# Patient Record
Sex: MALE | Race: WHITE | NOT HISPANIC OR LATINO
[De-identification: names, ages, dates, MRNs, and addresses within clinical notes are randomized per-mention and may not be internally consistent; named-entity substitution may affect disease eponyms.]

---

## 2017-12-27 PROBLEM — Z00.00 ENCOUNTER FOR PREVENTIVE HEALTH EXAMINATION: Status: ACTIVE | Noted: 2017-12-27

## 2018-01-04 ENCOUNTER — APPOINTMENT (OUTPATIENT)
Dept: CARDIOLOGY | Facility: CLINIC | Age: 73
End: 2018-01-04

## 2018-02-13 ENCOUNTER — OUTPATIENT (OUTPATIENT)
Dept: OUTPATIENT SERVICES | Facility: HOSPITAL | Age: 73
LOS: 1 days | End: 2018-02-13
Payer: MEDICARE

## 2018-02-13 PROCEDURE — 76770 US EXAM ABDO BACK WALL COMP: CPT | Mod: 26

## 2018-02-13 PROCEDURE — 76856 US EXAM PELVIC COMPLETE: CPT | Mod: 26

## 2018-02-14 ENCOUNTER — RECORD ABSTRACTING (OUTPATIENT)
Age: 73
End: 2018-02-14

## 2018-02-14 DIAGNOSIS — Z78.9 OTHER SPECIFIED HEALTH STATUS: ICD-10-CM

## 2018-02-14 DIAGNOSIS — Z86.39 PERSONAL HISTORY OF OTHER ENDOCRINE, NUTRITIONAL AND METABOLIC DISEASE: ICD-10-CM

## 2018-02-14 DIAGNOSIS — Z86.69 PERSONAL HISTORY OF OTHER DISEASES OF THE NERVOUS SYSTEM AND SENSE ORGANS: ICD-10-CM

## 2018-02-14 DIAGNOSIS — Z86.79 PERSONAL HISTORY OF OTHER DISEASES OF THE CIRCULATORY SYSTEM: ICD-10-CM

## 2018-02-15 ENCOUNTER — APPOINTMENT (OUTPATIENT)
Dept: CARDIOLOGY | Facility: CLINIC | Age: 73
End: 2018-02-15

## 2018-03-27 ENCOUNTER — NON-APPOINTMENT (OUTPATIENT)
Age: 73
End: 2018-03-27

## 2018-03-27 ENCOUNTER — APPOINTMENT (OUTPATIENT)
Dept: CARDIOLOGY | Facility: CLINIC | Age: 73
End: 2018-03-27
Payer: MEDICARE

## 2018-03-27 VITALS
DIASTOLIC BLOOD PRESSURE: 64 MMHG | BODY MASS INDEX: 30.53 KG/M2 | HEIGHT: 66 IN | SYSTOLIC BLOOD PRESSURE: 118 MMHG | WEIGHT: 190 LBS | HEART RATE: 77 BPM

## 2018-03-27 PROCEDURE — 99213 OFFICE O/P EST LOW 20 MIN: CPT

## 2019-03-27 ENCOUNTER — APPOINTMENT (OUTPATIENT)
Dept: CARDIOLOGY | Facility: CLINIC | Age: 74
End: 2019-03-27
Payer: MEDICARE

## 2019-03-27 ENCOUNTER — NON-APPOINTMENT (OUTPATIENT)
Age: 74
End: 2019-03-27

## 2019-03-27 VITALS
HEART RATE: 77 BPM | BODY MASS INDEX: 31.5 KG/M2 | WEIGHT: 196 LBS | SYSTOLIC BLOOD PRESSURE: 116 MMHG | OXYGEN SATURATION: 100 % | DIASTOLIC BLOOD PRESSURE: 56 MMHG | HEIGHT: 66 IN

## 2019-03-27 PROCEDURE — 93000 ELECTROCARDIOGRAM COMPLETE: CPT

## 2019-03-27 NOTE — PHYSICAL EXAM
[General Appearance - Well Developed] : well developed [Normal Appearance] : normal appearance [Well Groomed] : well groomed [General Appearance - Well Nourished] : well nourished [No Deformities] : no deformities [General Appearance - In No Acute Distress] : no acute distress [Normal Conjunctiva] : the conjunctiva exhibited no abnormalities [Eyelids - No Xanthelasma] : the eyelids demonstrated no xanthelasmas [Normal Oral Mucosa] : normal oral mucosa [No Oral Pallor] : no oral pallor [No Oral Cyanosis] : no oral cyanosis [Normal Jugular Venous A Waves Present] : normal jugular venous A waves present [Normal Jugular Venous V Waves Present] : normal jugular venous V waves present [No Jugular Venous Awad A Waves] : no jugular venous awad A waves [] : no respiratory distress [Respiration, Rhythm And Depth] : normal respiratory rhythm and effort [Exaggerated Use Of Accessory Muscles For Inspiration] : no accessory muscle use [Auscultation Breath Sounds / Voice Sounds] : lungs were clear to auscultation bilaterally [Heart Rate And Rhythm] : heart rate and rhythm were normal [Heart Sounds] : normal S1 and S2 [Murmurs] : no murmurs present

## 2019-03-27 NOTE — ASSESSMENT
[FreeTextEntry1] : Chest discomfort: The patient is status post bypass surgery. Approximately 2 months ago he developed chest discomfort syndrome. He was partially related to food intake. At times it was exertional. It lasted almost an entire month and then spontaneously resolved. Patient now is fairly good exercise ability without exertional symptoms. Exercise nuclear stress testing is the best option rule out ischemia. Transthoracic echocardiography is indicated to assess for pericardial disease.\par \par Cholesterol: The patient is tolerating statin therapy without difficulty.\par \par Mitral regurgitation: Will re re assess after echocardiography.

## 2019-05-06 ENCOUNTER — CLINICAL ADVICE (OUTPATIENT)
Age: 74
End: 2019-05-06

## 2019-05-06 ENCOUNTER — APPOINTMENT (OUTPATIENT)
Dept: CARDIOLOGY | Facility: CLINIC | Age: 74
End: 2019-05-06
Payer: MEDICARE

## 2019-05-06 PROCEDURE — 78452 HT MUSCLE IMAGE SPECT MULT: CPT

## 2019-05-06 PROCEDURE — A9502: CPT

## 2019-05-06 PROCEDURE — 93015 CV STRESS TEST SUPVJ I&R: CPT

## 2019-05-06 PROCEDURE — 93306 TTE W/DOPPLER COMPLETE: CPT

## 2019-05-10 ENCOUNTER — APPOINTMENT (OUTPATIENT)
Dept: CARDIOLOGY | Facility: CLINIC | Age: 74
End: 2019-05-10
Payer: MEDICARE

## 2019-05-10 ENCOUNTER — NON-APPOINTMENT (OUTPATIENT)
Age: 74
End: 2019-05-10

## 2019-05-10 VITALS
RESPIRATION RATE: 10 BRPM | HEART RATE: 72 BPM | BODY MASS INDEX: 30.53 KG/M2 | DIASTOLIC BLOOD PRESSURE: 62 MMHG | HEIGHT: 66 IN | SYSTOLIC BLOOD PRESSURE: 124 MMHG | WEIGHT: 190 LBS

## 2019-05-10 VITALS
WEIGHT: 190 LBS | HEIGHT: 66 IN | DIASTOLIC BLOOD PRESSURE: 62 MMHG | BODY MASS INDEX: 30.53 KG/M2 | HEART RATE: 72 BPM | SYSTOLIC BLOOD PRESSURE: 124 MMHG

## 2019-05-10 PROCEDURE — 93000 ELECTROCARDIOGRAM COMPLETE: CPT

## 2019-05-10 PROCEDURE — 99215 OFFICE O/P EST HI 40 MIN: CPT | Mod: 25

## 2019-05-10 NOTE — PHYSICAL EXAM
[Normal Appearance] : normal appearance [General Appearance - Well Developed] : well developed [General Appearance - In No Acute Distress] : no acute distress [General Appearance - Well Nourished] : well nourished [Well Groomed] : well groomed [Normal Conjunctiva] : the conjunctiva exhibited no abnormalities [Eyelids - No Xanthelasma] : the eyelids demonstrated no xanthelasmas [Normal Oral Mucosa] : normal oral mucosa [No Oral Pallor] : no oral pallor [No Oral Cyanosis] : no oral cyanosis [Respiration, Rhythm And Depth] : normal respiratory rhythm and effort [Exaggerated Use Of Accessory Muscles For Inspiration] : no accessory muscle use [Auscultation Breath Sounds / Voice Sounds] : lungs were clear to auscultation bilaterally [Heart Rate And Rhythm] : heart rate and rhythm were normal [Heart Sounds] : normal S1 and S2 [Murmurs] : no murmurs present [Abnormal Walk] : normal gait [Nail Clubbing] : no clubbing of the fingernails [Cyanosis, Localized] : no localized cyanosis [Petechial Hemorrhages (___cm)] : no petechial hemorrhages [] : no rash [Skin Turgor] : normal skin turgor [Memory Recent] : recent memory was not impaired [Affect] : the affect was normal [Impaired Insight] : insight and judgment were intact

## 2019-05-10 NOTE — DISCUSSION/SUMMARY
[FreeTextEntry1] : 1. Coronary Artery Disease: reviewed catheterization report from 2012 with patient. Although nuclear images reveal no ischemia patient with exertional angina. At this point, given patient's severe CAD and anatomy, I would recommend to maximize anti-anginal medications before invasive procedure considered. He was started on Metoprolol Succinate 25mg daily (high risk medication with no signs of toxicity). I will add Imdur 30mg daily. Patient can follow up with 6 weeks to determine of these medications can be titrated up. Asked patient to call [phone] immediately if her experiences persistent pain that is not relieved with rest.

## 2019-05-10 NOTE — HISTORY OF PRESENT ILLNESS
[FreeTextEntry1] : This is a 73 year old male with CAD s/p CABG. Underwent nuclear stress testing for exertional angina. Patient started on Toprol XL 25mg daily the day of stress testing. Patient continues with exertional angina.

## 2019-05-10 NOTE — REVIEW OF SYSTEMS
[Chest Pain] : chest pain [Joint Pain] : joint pain [Joint Stiffness] : joint stiffness [Negative] : Endocrine [Shortness Of Breath] : no shortness of breath [Lower Ext Edema] : no extremity edema [Dyspnea on exertion] : not dyspnea during exertion [Palpitations] : no palpitations [Easy Bleeding] : no tendency for easy bleeding [Easy Bruising] : no tendency for easy bruising

## 2019-06-28 ENCOUNTER — APPOINTMENT (OUTPATIENT)
Dept: CARDIOLOGY | Facility: CLINIC | Age: 74
End: 2019-06-28

## 2019-11-03 ENCOUNTER — TRANSCRIPTION ENCOUNTER (OUTPATIENT)
Age: 74
End: 2019-11-03

## 2020-07-03 ENCOUNTER — TRANSCRIPTION ENCOUNTER (OUTPATIENT)
Age: 75
End: 2020-07-03

## 2020-07-03 ENCOUNTER — EMERGENCY (EMERGENCY)
Facility: HOSPITAL | Age: 75
LOS: 1 days | End: 2020-07-03
Admitting: EMERGENCY MEDICINE
Payer: MEDICARE

## 2020-07-03 PROCEDURE — 99283 EMERGENCY DEPT VISIT LOW MDM: CPT | Mod: 25

## 2020-07-03 PROCEDURE — 65220 REMOVE FOREIGN BODY FROM EYE: CPT

## 2020-08-06 ENCOUNTER — NON-APPOINTMENT (OUTPATIENT)
Age: 75
End: 2020-08-06

## 2020-08-06 ENCOUNTER — APPOINTMENT (OUTPATIENT)
Dept: CARDIOLOGY | Facility: CLINIC | Age: 75
End: 2020-08-06
Payer: MEDICARE

## 2020-08-06 VITALS
HEIGHT: 66 IN | BODY MASS INDEX: 28.93 KG/M2 | SYSTOLIC BLOOD PRESSURE: 108 MMHG | HEART RATE: 62 BPM | OXYGEN SATURATION: 99 % | DIASTOLIC BLOOD PRESSURE: 60 MMHG | TEMPERATURE: 97.1 F | WEIGHT: 180 LBS

## 2020-08-06 PROCEDURE — 99214 OFFICE O/P EST MOD 30 MIN: CPT | Mod: 25

## 2020-08-06 PROCEDURE — 93000 ELECTROCARDIOGRAM COMPLETE: CPT

## 2020-08-06 RX ORDER — ISOSORBIDE MONONITRATE 30 MG/1
30 TABLET, EXTENDED RELEASE ORAL
Qty: 30 | Refills: 6 | Status: DISCONTINUED | COMMUNITY
Start: 2019-05-10 | End: 2020-08-06

## 2020-08-06 RX ORDER — SIMVASTATIN 40 MG/1
40 TABLET, FILM COATED ORAL DAILY
Refills: 0 | Status: DISCONTINUED | COMMUNITY
End: 2020-08-06

## 2020-08-06 RX ORDER — METOPROLOL SUCCINATE 25 MG/1
25 TABLET, EXTENDED RELEASE ORAL
Qty: 90 | Refills: 2 | Status: DISCONTINUED | COMMUNITY
Start: 2019-05-06 | End: 2020-08-06

## 2020-08-06 NOTE — REVIEW OF SYSTEMS
[Chest Pain] : chest pain [Joint Pain] : joint pain [Joint Stiffness] : joint stiffness [Negative] : Endocrine [Shortness Of Breath] : no shortness of breath [Dyspnea on exertion] : not dyspnea during exertion [Lower Ext Edema] : no extremity edema [Easy Bleeding] : no tendency for easy bleeding [Palpitations] : no palpitations [Easy Bruising] : no tendency for easy bruising

## 2020-08-06 NOTE — PHYSICAL EXAM
[General Appearance - Well Developed] : well developed [Well Groomed] : well groomed [Normal Appearance] : normal appearance [General Appearance - Well Nourished] : well nourished [General Appearance - In No Acute Distress] : no acute distress [Normal Conjunctiva] : the conjunctiva exhibited no abnormalities [Eyelids - No Xanthelasma] : the eyelids demonstrated no xanthelasmas [Normal Oral Mucosa] : normal oral mucosa [No Oral Pallor] : no oral pallor [No Oral Cyanosis] : no oral cyanosis [Respiration, Rhythm And Depth] : normal respiratory rhythm and effort [Exaggerated Use Of Accessory Muscles For Inspiration] : no accessory muscle use [Auscultation Breath Sounds / Voice Sounds] : lungs were clear to auscultation bilaterally [Heart Rate And Rhythm] : heart rate and rhythm were normal [Heart Sounds] : normal S1 and S2 [Murmurs] : no murmurs present [Abnormal Walk] : normal gait [Nail Clubbing] : no clubbing of the fingernails [Petechial Hemorrhages (___cm)] : no petechial hemorrhages [Cyanosis, Localized] : no localized cyanosis [Skin Turgor] : normal skin turgor [] : no rash [Impaired Insight] : insight and judgment were intact [Affect] : the affect was normal [Memory Recent] : recent memory was not impaired

## 2020-08-06 NOTE — DISCUSSION/SUMMARY
[FreeTextEntry1] : 1. Coronary Artery Disease: patient s/p CABG x 3 in the past. Patient with no exertional angina, SOB at this time. HRs are already in the low 60s off beta blocker therapy. Continue off. Patient also had his simvastatin switched to rosuvastatin 20mg daily. His LDL was 104 on 7/17/2020. Goal is <70. Recommend repeat lipid panel in 3-6 months. If not at goal recommend titrating up to 40mg daily. Continue Aspirin 81mg daily, Lisinopril 10mg daily.\par \par 2. HTN: appears controlled. Continue Lisinopril 10mg daily\par \par 3. HLD: Patient had his simvastatin switched to rosuvastatin 20mg daily. Unclear when this happened. His LDL was 104 on 7/17/2020. Goal is <70. Recommend repeat lipid panel in 3-6 months. If not at goal recommend titrating up to 40mg daily.\par \par 4. Mild Mitral Valve Regurgitation: periodic echo surveillance.

## 2020-08-06 NOTE — HISTORY OF PRESENT ILLNESS
[FreeTextEntry1] : This is a 74 year old male with CAD s/p CABG. Last year he had exertional angina. He was started on Toprol XL 25mg daily and Imdur 30mg daily, in addition to his other medications. Unclear when these medications dropped off. Patient has poor insight in his medications and medical history. He currently denies any exertional chest pain, SOB, or palpitations. He admits to compliance with all his other medications and reports no adverse effects.

## 2021-02-10 ENCOUNTER — NON-APPOINTMENT (OUTPATIENT)
Age: 76
End: 2021-02-10

## 2021-02-10 ENCOUNTER — APPOINTMENT (OUTPATIENT)
Dept: CARDIOLOGY | Facility: CLINIC | Age: 76
End: 2021-02-10
Payer: MEDICARE

## 2021-02-10 VITALS
HEART RATE: 64 BPM | WEIGHT: 187 LBS | BODY MASS INDEX: 30.05 KG/M2 | OXYGEN SATURATION: 99 % | SYSTOLIC BLOOD PRESSURE: 142 MMHG | HEIGHT: 66 IN | TEMPERATURE: 97.1 F | DIASTOLIC BLOOD PRESSURE: 70 MMHG

## 2021-02-10 PROCEDURE — 99214 OFFICE O/P EST MOD 30 MIN: CPT | Mod: 25

## 2021-02-10 PROCEDURE — 93000 ELECTROCARDIOGRAM COMPLETE: CPT

## 2021-02-10 NOTE — DISCUSSION/SUMMARY
[FreeTextEntry1] : 1. Coronary Artery Disease: patient s/p CABG x 3 in the past. Patient with no exertional angina, SOB at this time. HRs are already in the low 60s off beta blocker therapy. Continue off. Patient also had his simvastatin switched to rosuvastatin 20mg daily. His LDL was 104 on 7/17/2020. Goal is <70. Patient had lipid panel done about 1-2 weeks ago with PCP. Will attempt to obtain and review.  If not at goal recommend titrating up to 40mg daily. Continue Aspirin 81mg daily, Lisinopril 5mg daily.\par \par 2. HTN: appears controlled. Continue Lisinopril 5mg daily\par \par 3. HLD: Continue rosuvastatin 20mg daily. His LDL was 104 on 7/17/2020. Goal is <70. Patient had lipid panel done about 1-2 weeks ago with PCP. Will attempt to obtain and review.  If not at goal recommend titrating up to 40mg daily.\par \par 4. Mild Mitral Valve Regurgitation: periodic echo surveillance. \par \par Follow up in 6 months.

## 2021-02-10 NOTE — REVIEW OF SYSTEMS
[Chest Pain] : chest pain [Joint Pain] : joint pain [Joint Stiffness] : joint stiffness [Negative] : Endocrine [Shortness Of Breath] : no shortness of breath [Dyspnea on exertion] : not dyspnea during exertion [Lower Ext Edema] : no extremity edema [Palpitations] : no palpitations [Easy Bleeding] : no tendency for easy bleeding [Easy Bruising] : no tendency for easy bruising

## 2021-02-10 NOTE — HISTORY OF PRESENT ILLNESS
[FreeTextEntry1] : Historical Perspective"\par This is a 74 year old male with CAD s/p CABG. Last year he had exertional angina. He was started on Toprol XL 25mg daily and Imdur 30mg daily, in addition to his other medications. Unclear when these medications dropped off. Patient has poor insight in his medications and medical history. He currently denies any exertional chest pain, SOB, or palpitations. He admits to compliance with all his other medications and reports no adverse effects. \par \par Current Health Status:\par Patient with no chest pain, SOB, or palpitations. No hospitalizations since seeing me last. Remains compliant with his medications and reports no adverse effects.\par

## 2021-02-10 NOTE — PHYSICAL EXAM
[General Appearance - Well Developed] : well developed [Normal Appearance] : normal appearance [Well Groomed] : well groomed [General Appearance - Well Nourished] : well nourished [General Appearance - In No Acute Distress] : no acute distress [Normal Conjunctiva] : the conjunctiva exhibited no abnormalities [Eyelids - No Xanthelasma] : the eyelids demonstrated no xanthelasmas [Normal Oral Mucosa] : normal oral mucosa [No Oral Pallor] : no oral pallor [No Oral Cyanosis] : no oral cyanosis [Respiration, Rhythm And Depth] : normal respiratory rhythm and effort [Exaggerated Use Of Accessory Muscles For Inspiration] : no accessory muscle use [Auscultation Breath Sounds / Voice Sounds] : lungs were clear to auscultation bilaterally [Heart Rate And Rhythm] : heart rate and rhythm were normal [Heart Sounds] : normal S1 and S2 [Murmurs] : no murmurs present [Abnormal Walk] : normal gait [Nail Clubbing] : no clubbing of the fingernails [Cyanosis, Localized] : no localized cyanosis [Petechial Hemorrhages (___cm)] : no petechial hemorrhages [Skin Turgor] : normal skin turgor [] : no rash [Impaired Insight] : insight and judgment were intact [Affect] : the affect was normal [Memory Recent] : recent memory was not impaired

## 2021-03-01 ENCOUNTER — APPOINTMENT (OUTPATIENT)
Dept: CARDIOLOGY | Facility: CLINIC | Age: 76
End: 2021-03-01
Payer: MEDICARE

## 2021-03-01 PROCEDURE — 93306 TTE W/DOPPLER COMPLETE: CPT

## 2021-08-05 ENCOUNTER — APPOINTMENT (OUTPATIENT)
Dept: CARDIOLOGY | Facility: CLINIC | Age: 76
End: 2021-08-05

## 2021-08-18 ENCOUNTER — NON-APPOINTMENT (OUTPATIENT)
Age: 76
End: 2021-08-18

## 2021-08-18 ENCOUNTER — APPOINTMENT (OUTPATIENT)
Dept: CARDIOLOGY | Facility: CLINIC | Age: 76
End: 2021-08-18
Payer: MEDICARE

## 2021-08-18 VITALS
HEIGHT: 64 IN | SYSTOLIC BLOOD PRESSURE: 132 MMHG | HEART RATE: 67 BPM | OXYGEN SATURATION: 99 % | BODY MASS INDEX: 31.92 KG/M2 | DIASTOLIC BLOOD PRESSURE: 62 MMHG | WEIGHT: 187 LBS | TEMPERATURE: 96.9 F

## 2021-08-18 PROCEDURE — 99214 OFFICE O/P EST MOD 30 MIN: CPT | Mod: 25

## 2021-08-18 PROCEDURE — 93000 ELECTROCARDIOGRAM COMPLETE: CPT

## 2021-08-18 NOTE — CARDIOLOGY SUMMARY
[de-identified] : 08/18/2021, NSR with extensive anterolateral infarct, inferior infarct, negative T waves. [de-identified] : 05/06/2019, Exercise Nuclear Stress Test: Exercised for 4 minutes and 30 seconds utilizing Weston Protocol. Downsloping ST depressions developed inferolaterally with exercise. Nuclear images revealed medium sized defects inferiorly and inferolaterally that are fixed. Ischemia was not evident.  [de-identified] : 5/6/2019, Mild MR, Basal inferior wall hypokinetic, Trace MR. LVEF 55%. \par

## 2021-08-18 NOTE — DISCUSSION/SUMMARY
[FreeTextEntry1] : 1. Coronary Artery Disease: patient s/p CABG x 3 in the past. Patient with no exertional angina, SOB at this time. HRs are already in the low 60s off beta blocker therapy. Continue off. Patient also had his simvastatin switched to rosuvastatin 20mg daily. His LDL was 104 on 7/17/2020. Goal is <70. Patient had lipid panel done about 1-2 weeks ago with PCP. Will attempt to obtain and review. If not at goal recommend titrating up to 40mg daily. Continue Aspirin 81mg daily, Lisinopril 5mg daily.\par \par 2. HTN: appears controlled. Continue Lisinopril 5mg daily\par \par 3. HLD: Continue rosuvastatin 20mg daily. \par \par 4. Mild Mitral Valve Regurgitation: periodic echo surveillance. \par \par Follow up in 6 months.

## 2021-08-18 NOTE — PHYSICAL EXAM
[Normal] : moves all extremities, no focal deficits, normal speech [de-identified] : No JVD, no carotid artery bruits auscultated bilaterally

## 2021-08-18 NOTE — HISTORY OF PRESENT ILLNESS
[FreeTextEntry1] : Historical Perspective"\par This is a 75 year old male with CAD s/p CABG. Last year he had exertional angina. He was started on Toprol XL 25mg daily and Imdur 30mg daily, in addition to his other medications. Unclear when these medications dropped off. Patient has poor insight in his medications and medical history. He currently denies any exertional chest pain, SOB, or palpitations. He admits to compliance with all his other medications and reports no adverse effects. \par \par Current Health Status:\par Patient with no chest pain, SOB, or palpitations. No hospitalizations since seeing me last. Remains compliant with his medications and reports no adverse effects.

## 2022-05-03 ENCOUNTER — APPOINTMENT (OUTPATIENT)
Dept: CARDIOLOGY | Facility: CLINIC | Age: 77
End: 2022-05-03

## 2022-05-05 ENCOUNTER — APPOINTMENT (OUTPATIENT)
Dept: CARDIOLOGY | Facility: CLINIC | Age: 77
End: 2022-05-05
Payer: MEDICARE

## 2022-05-05 ENCOUNTER — NON-APPOINTMENT (OUTPATIENT)
Age: 77
End: 2022-05-05

## 2022-05-05 VITALS
HEART RATE: 68 BPM | HEIGHT: 66 IN | DIASTOLIC BLOOD PRESSURE: 60 MMHG | SYSTOLIC BLOOD PRESSURE: 114 MMHG | BODY MASS INDEX: 29.57 KG/M2 | WEIGHT: 184 LBS | OXYGEN SATURATION: 99 %

## 2022-05-05 PROCEDURE — 99214 OFFICE O/P EST MOD 30 MIN: CPT | Mod: 25

## 2022-05-05 PROCEDURE — 93000 ELECTROCARDIOGRAM COMPLETE: CPT

## 2022-05-05 RX ORDER — GLIMEPIRIDE 4 MG/1
TABLET ORAL
Refills: 0 | Status: DISCONTINUED | COMMUNITY
End: 2022-05-05

## 2022-05-05 NOTE — DISCUSSION/SUMMARY
[FreeTextEntry1] : 1. Coronary Artery Disease: patient s/p CABG x 3 in the past. Patient with no exertional angina, SOB at this time. HRs are already in the 60s off beta blocker therapy. Continue off. Continue rosuvastatin 20mg daily.  Continue Aspirin 81mg daily, Lisinopril 10mg daily. Recommend surveillance nuclear stress test as it has been 3 years since last one.\par \par 2. HTN: appears controlled. Continue Lisinopril 10mg daily\par \par 3. HLD: Continue rosuvastatin 20mg daily. \par \par 4. Mild Mitral Valve Regurgitation: periodic echo surveillance. \par \par Follow up in 6 months.

## 2022-05-05 NOTE — CARDIOLOGY SUMMARY
[de-identified] : 05/5/2022, NSR with extensive anterolateral infarct, inferior infarct, negative T waves. [de-identified] : 05/06/2019, Exercise Nuclear Stress Test: Exercised for 4 minutes and 30 seconds utilizing Weston Protocol. Downsloping ST depressions developed inferolaterally with exercise. Nuclear images revealed medium sized defects inferiorly and inferolaterally that are fixed. Ischemia was not evident.  [de-identified] : 5/6/2019, Mild MR, Basal inferior wall hypokinetic, Trace MR. LVEF 55%. \par

## 2022-05-05 NOTE — PHYSICAL EXAM
[Normal] : moves all extremities, no focal deficits, normal speech [de-identified] : No carotid artery bruits auscultated bilaterally

## 2022-05-31 ENCOUNTER — APPOINTMENT (OUTPATIENT)
Dept: CARDIOLOGY | Facility: CLINIC | Age: 77
End: 2022-05-31
Payer: MEDICARE

## 2022-05-31 DIAGNOSIS — R07.9 CHEST PAIN, UNSPECIFIED: ICD-10-CM

## 2022-05-31 PROCEDURE — 93306 TTE W/DOPPLER COMPLETE: CPT

## 2022-05-31 PROCEDURE — 78452 HT MUSCLE IMAGE SPECT MULT: CPT

## 2022-05-31 PROCEDURE — A9502: CPT

## 2022-05-31 PROCEDURE — 93015 CV STRESS TEST SUPVJ I&R: CPT

## 2022-05-31 PROCEDURE — 99215 OFFICE O/P EST HI 40 MIN: CPT

## 2022-06-01 PROBLEM — R07.9 CHEST PAIN: Status: ACTIVE | Noted: 2019-03-27

## 2022-06-01 NOTE — REASON FOR VISIT
[Symptom and Test Evaluation] : symptom and test evaluation [Other: ____] : [unfilled] [FreeTextEntry3] : Puja Cohen

## 2022-06-01 NOTE — HISTORY OF PRESENT ILLNESS
[FreeTextEntry1] : Historical Perspective"\par This is a 76 year old male with CAD s/p CABG. Last year he had exertional angina. He was started on Toprol XL 25mg daily and Imdur 30mg daily, in addition to his other medications. Unclear when these medications dropped off. Patient has poor insight in his medications and medical history. He currently denies any exertional chest pain, SOB, or palpitations. He admits to compliance with all his other medications and reports no adverse effects. \par \par Current Health Status:\par Seen today for urgent evaluation.  Underwent nuclear stress testing this morning.  Developed 8 out of 10 chest pain with minimal exertion that required conversion to pharmacologic vasodilator.  Significant EKG changes with exercise and kristine-infarct ischemia noted on preliminary reading of myocardial perfusion information.  Chest pain resolved on its own within 5 minutes of rest.  Patient reports history of exertional chest discomfort.  Denies prolonged anginal symptoms with rest.  Currently asymptomatic.  Repeat EKG with no acute changes.

## 2022-06-01 NOTE — PHYSICAL EXAM
[Normal] : moves all extremities, no focal deficits, normal speech [de-identified] : No carotid artery bruits auscultated bilaterally

## 2022-06-01 NOTE — CARDIOLOGY SUMMARY
[de-identified] : 05/5/2022, NSR with extensive anterolateral infarct, inferior infarct, negative T waves. [de-identified] : 05/06/2019, Exercise Nuclear Stress Test: Exercised for 4 minutes and 30 seconds utilizing Weston Protocol. Downsloping ST depressions developed inferolaterally with exercise. Nuclear images revealed medium sized defects inferiorly and inferolaterally that are fixed. Ischemia was not evident.  [de-identified] : 5/6/2019, Mild MR, Basal inferior wall hypokinetic, Trace MR. LVEF 55%. \par

## 2022-06-01 NOTE — DISCUSSION/SUMMARY
[FreeTextEntry1] : 1. Coronary Artery Disease: patient s/p CABG x 3 in the past.  Now complaining of anginal symptoms.  Currently asymptomatic.  Abnormal nuclear stress test.  Recommend cardiac catheterization for further evaluation.  Continue aspirin and statin.  HRs are already in the 60s off beta blocker therapy. Continue off. Continue Lisinopril 10mg daily.  Red flag symptoms that would warrant emergent evaluation discussed.  Pt verbalizes understanding. \par \par 2. HTN: appears controlled. Continue Lisinopril 10mg daily\par \par 3. HLD: Continue rosuvastatin 20mg daily. \par \par 4. Mild Mitral Valve Regurgitation: periodic echo surveillance. \par \par Follow up after catheterization.

## 2022-06-06 ENCOUNTER — NON-APPOINTMENT (OUTPATIENT)
Age: 77
End: 2022-06-06

## 2022-07-25 DIAGNOSIS — Z01.818 ENCOUNTER FOR OTHER PREPROCEDURAL EXAMINATION: ICD-10-CM

## 2022-07-29 ENCOUNTER — LABORATORY RESULT (OUTPATIENT)
Age: 77
End: 2022-07-29

## 2022-07-30 LAB — SARS-COV-2 N GENE NPH QL NAA+PROBE: NOT DETECTED

## 2022-08-01 ENCOUNTER — OUTPATIENT (OUTPATIENT)
Dept: INPATIENT UNIT | Facility: HOSPITAL | Age: 77
LOS: 1 days | End: 2022-08-01

## 2022-08-01 PROCEDURE — 99024 POSTOP FOLLOW-UP VISIT: CPT

## 2022-08-01 PROCEDURE — 93455 CORONARY ART/GRFT ANGIO S&I: CPT | Mod: 26,XU

## 2022-08-01 PROCEDURE — 93010 ELECTROCARDIOGRAM REPORT: CPT | Mod: 76

## 2022-08-01 PROCEDURE — 92937 PRQ TRLUML REVSC CAB GRF 1: CPT | Mod: LC

## 2022-08-02 PROCEDURE — 93010 ELECTROCARDIOGRAM REPORT: CPT

## 2022-08-03 ENCOUNTER — APPOINTMENT (OUTPATIENT)
Dept: CARDIOLOGY | Facility: CLINIC | Age: 77
End: 2022-08-03

## 2022-08-03 VITALS
OXYGEN SATURATION: 97 % | HEART RATE: 67 BPM | SYSTOLIC BLOOD PRESSURE: 114 MMHG | HEIGHT: 66 IN | BODY MASS INDEX: 28.93 KG/M2 | DIASTOLIC BLOOD PRESSURE: 70 MMHG | WEIGHT: 180 LBS | TEMPERATURE: 97.3 F

## 2022-08-03 PROCEDURE — 99214 OFFICE O/P EST MOD 30 MIN: CPT

## 2022-08-03 RX ORDER — ROSUVASTATIN CALCIUM 20 MG/1
20 TABLET, FILM COATED ORAL
Qty: 90 | Refills: 3 | Status: DISCONTINUED | COMMUNITY
Start: 2020-08-06 | End: 2022-08-03

## 2022-08-03 RX ORDER — LISINOPRIL 10 MG/1
10 TABLET ORAL
Qty: 90 | Refills: 3 | Status: DISCONTINUED | COMMUNITY
Start: 2021-12-01 | End: 2022-08-03

## 2022-08-03 NOTE — ASSESSMENT
[FreeTextEntry1] : CAD: Patient is status post coronary artery bypass grafting.  Recently underwent stenting of SVG to OM.  He got marked clinical benefit.  There has been no recurrence of chest discomfort or shortness of breath.  The patient feels excellent.\par \par The need for compliance with dual antiplatelet therapy has been reviewed.\par \par Hypertension: Well-controlled.\par \par Anemia: Repeat CBC has been arranged.\par \par Renal insufficiency: At [facility] his renal function was normal.\par \par Hyperlipidemia: Today we increased his rosuvastatin from 20 mg daily up to 40 mg daily.  LFTs and cholesterol profile in the fasting state have been arranged for 6 weeks time.\par \par Echo, labs, cath reviewed.

## 2022-08-10 DIAGNOSIS — E78.5 HYPERLIPIDEMIA, UNSPECIFIED: ICD-10-CM

## 2022-08-10 DIAGNOSIS — I10 ESSENTIAL (PRIMARY) HYPERTENSION: ICD-10-CM

## 2022-08-10 DIAGNOSIS — I25.10 ATHEROSCLEROTIC HEART DISEASE OF NATIVE CORONARY ARTERY WITHOUT ANGINA PECTORIS: ICD-10-CM

## 2022-08-10 DIAGNOSIS — I20.0 UNSTABLE ANGINA: ICD-10-CM

## 2022-08-10 DIAGNOSIS — Z95.1 PRESENCE OF AORTOCORONARY BYPASS GRAFT: ICD-10-CM

## 2022-08-10 DIAGNOSIS — R07.9 CHEST PAIN, UNSPECIFIED: ICD-10-CM

## 2022-08-10 DIAGNOSIS — I34.0 NONRHEUMATIC MITRAL (VALVE) INSUFFICIENCY: ICD-10-CM

## 2022-08-10 DIAGNOSIS — Z79.82 LONG TERM (CURRENT) USE OF ASPIRIN: ICD-10-CM

## 2022-08-16 ENCOUNTER — NON-APPOINTMENT (OUTPATIENT)
Age: 77
End: 2022-08-16

## 2022-08-18 ENCOUNTER — NON-APPOINTMENT (OUTPATIENT)
Age: 77
End: 2022-08-18

## 2022-08-18 ENCOUNTER — APPOINTMENT (OUTPATIENT)
Dept: CARDIOLOGY | Facility: CLINIC | Age: 77
End: 2022-08-18

## 2022-08-18 VITALS
SYSTOLIC BLOOD PRESSURE: 118 MMHG | HEIGHT: 66 IN | BODY MASS INDEX: 28.28 KG/M2 | WEIGHT: 176 LBS | OXYGEN SATURATION: 98 % | TEMPERATURE: 96.9 F | HEART RATE: 62 BPM | DIASTOLIC BLOOD PRESSURE: 62 MMHG

## 2022-08-18 DIAGNOSIS — R42 DIZZINESS AND GIDDINESS: ICD-10-CM

## 2022-08-18 PROCEDURE — 93242 EXT ECG>48HR<7D RECORDING: CPT | Mod: 59

## 2022-08-18 PROCEDURE — 99214 OFFICE O/P EST MOD 30 MIN: CPT

## 2022-08-18 NOTE — ADDENDUM
[FreeTextEntry1] : Please note the patient was reviewed with the PA.\par I was physically present during the service of the patient\par I was directly involved in the management plan and recommendations of care provided to the patient. \par I personally reviewed the history and physical exam and plan as documented by the PA above.\par \par Tarun Knox DO, FACC, RPVI\par Cardiologist\par \par 8/18/2022\par

## 2022-08-18 NOTE — PHYSICAL EXAM
[Well Developed] : well developed [Well Nourished] : well nourished [No Acute Distress] : no acute distress [Normal Conjunctiva] : normal conjunctiva [Normal Venous Pressure] : normal venous pressure [No Carotid Bruit] : no carotid bruit [Normal] : normal S1, S2, no murmur, no rub, no gallop [Normal S1, S2] : normal S1, S2 [No Murmur] : no murmur [No Rub] : no rub [No Gallop] : no gallop [Clear Lung Fields] : clear lung fields [Good Air Entry] : good air entry [No Respiratory Distress] : no respiratory distress  [Soft] : abdomen soft [Non Tender] : non-tender [No Masses/organomegaly] : no masses/organomegaly [Normal Bowel Sounds] : normal bowel sounds [Normal Gait] : normal gait [No Edema] : no edema [No Cyanosis] : no cyanosis [No Clubbing] : no clubbing [No Varicosities] : no varicosities [No Rash] : no rash [No Skin Lesions] : no skin lesions [Moves all extremities] : moves all extremities [No Focal Deficits] : no focal deficits [Normal Speech] : normal speech [Alert and Oriented] : alert and oriented [Normal memory] : normal memory

## 2022-08-18 NOTE — HISTORY OF PRESENT ILLNESS
[FreeTextEntry1] : MALIKA NVAARRO  is a 76 year M  who presents today Aug 18, 2022 complaining of lightheadedness with quick positional change of lying to standing first thing in the morning. Symptoms have improved today. There is no associated chest pain or shortness of breath. \par Overall he has been feeling well. There has been no recent illness or hospital stay. Medications have remained unchanged. \par \par Today he denies chest pain, pressure, unusual shortness of breath, lightheadedness, dizziness, near syncope or syncope. \par \par \par CAD: Patient is status post coronary artery bypass grafting.  Recently underwent stenting of SVG to OM.  He got marked clinical benefit.  There has been no recurrence of chest discomfort or shortness of breath.  The patient feels excellent.\par \par The need for compliance with dual antiplatelet therapy has been reviewed.\par \par Hypertension: Well-controlled.\par \par Anemia: Repeat CBC has been arranged.\par \par Renal insufficiency: At [facility] his renal function was normal.\par \par Hyperlipidemia: Today we increased his rosuvastatin from 20 mg daily up to 40 mg daily.  LFTs and cholesterol profile in the fasting state have been arranged for 6 weeks time.\par \par Echo, labs, cath reviewed.

## 2022-08-18 NOTE — ASSESSMENT
[FreeTextEntry1] : MALIKA NAVARRO  is a 76 year M  who presents today Aug 18, 2022 with the above history and the following active issues.\par \par CAD: Patient is status post coronary artery bypass grafting.  Recently underwent stenting of SVG to OM.  He got marked clinical benefit.  There has been no recurrence of chest discomfort or shortness of breath.  \par \par The need for compliance with dual antiplatelet therapy has been reviewed.\par \par Hypertension: Well-controlled.\par \par Anemia: Repeat CBC has been arranged.\par \par Renal insufficiency: At [facility] his renal function was normal.\par \par Hyperlipidemia: Today we increased his rosuvastatin from 20 mg daily up to 40 mg daily.  \par \par Lightheadedness. Patient blood pressure on my assessment 108/60 and not orthostatic. Recommend follow-up labs. 3 day ZIO monitor. Consider purchasing home blood pressure monitor.\par \par Red flag symptoms which would warrant sooner emergent evaluation reviewed with the patient. \par Questions and concerns were addressed and answered. \par Limitations of non-invasive testing reviewed.\par \par Sincerely,\par \par Esperanza Palacios PA-C\par Patients history, testing and plan reviewed with supervising MD: Dr. Knox\par \par

## 2022-08-30 ENCOUNTER — APPOINTMENT (OUTPATIENT)
Dept: CARDIOLOGY | Facility: CLINIC | Age: 77
End: 2022-08-30

## 2022-08-30 PROCEDURE — 93244 EXT ECG>48HR<7D REV&INTERPJ: CPT

## 2022-09-21 ENCOUNTER — NON-APPOINTMENT (OUTPATIENT)
Age: 77
End: 2022-09-21

## 2022-10-05 ENCOUNTER — APPOINTMENT (OUTPATIENT)
Dept: CARDIOLOGY | Facility: CLINIC | Age: 77
End: 2022-10-05

## 2022-10-05 VITALS
SYSTOLIC BLOOD PRESSURE: 110 MMHG | TEMPERATURE: 96.9 F | DIASTOLIC BLOOD PRESSURE: 58 MMHG | HEIGHT: 66 IN | WEIGHT: 180 LBS | OXYGEN SATURATION: 98 % | HEART RATE: 60 BPM | BODY MASS INDEX: 28.93 KG/M2

## 2022-10-05 PROCEDURE — 99214 OFFICE O/P EST MOD 30 MIN: CPT

## 2022-10-05 NOTE — HISTORY OF PRESENT ILLNESS
[FreeTextEntry1] : CAD: The patient underwent coronary stenting of the SVG to the OM.  This resulted in marked clinical benefit which she continues to enjoy to this day.  There is no chest discomfort.  There is no shortness of breath.\par \par Hypertension: Well-controlled.\par \par Anemia: The patient was advised to get repeat CBC.  He states he did not get it done but will get it done soon.\par \par Hyperlipidemia: LFTs and cholesterol profile on increased dose of rosuvastatin have been arranged.

## 2022-12-22 ENCOUNTER — APPOINTMENT (OUTPATIENT)
Dept: CARDIOLOGY | Facility: CLINIC | Age: 77
End: 2022-12-22

## 2022-12-22 VITALS
HEIGHT: 66 IN | BODY MASS INDEX: 28.93 KG/M2 | DIASTOLIC BLOOD PRESSURE: 66 MMHG | SYSTOLIC BLOOD PRESSURE: 116 MMHG | OXYGEN SATURATION: 99 % | HEART RATE: 56 BPM | WEIGHT: 180 LBS

## 2022-12-22 PROCEDURE — 99214 OFFICE O/P EST MOD 30 MIN: CPT

## 2022-12-22 NOTE — CARDIOLOGY SUMMARY
[de-identified] : 05/5/2022, NSR with extensive anterolateral infarct, inferior infarct, negative T waves. [de-identified] : 05/06/2019, Exercise Nuclear Stress Test: Exercised for 4 minutes and 30 seconds utilizing Weston Protocol. Downsloping ST depressions developed inferolaterally with exercise. Nuclear images revealed medium sized defects inferiorly and inferolaterally that are fixed. Ischemia was not evident.  [de-identified] : 5/6/2019, Mild MR, Basal inferior wall hypokinetic, Trace MR. LVEF 55%. \par  [de-identified] : 8/1/2022, 3 stents to SVG-->OM

## 2022-12-22 NOTE — PHYSICAL EXAM
[Normal] : moves all extremities, no focal deficits, normal speech [de-identified] : No carotid artery bruits auscultated bilaterally

## 2022-12-22 NOTE — HISTORY OF PRESENT ILLNESS
[FreeTextEntry1] : Historical Perspective"\par This is a 75 year old male with CAD s/p CABG. Last year he had exertional angina. He was started on Toprol XL 25mg daily and Imdur 30mg daily, in addition to his other medications. Unclear when these medications dropped off. Patient has poor insight in his medications and medical history. He currently denies any exertional chest pain, SOB, or palpitations. He admits to compliance with all his other medications and reports no adverse effects. \par \par Current Health Status:\par Patient with no chest pain, SOB, or palpitations. Remains compliant with his medications and reports no adverse effects. Since seeing me last patient underwent cardiac catheterization with Dr. Alejo. He placed 3 stents in the SVG-->OM.

## 2022-12-22 NOTE — DISCUSSION/SUMMARY
[FreeTextEntry1] : 1. Coronary Artery Disease: patient s/p CABG x 3 in the past. Also s/p PCI with three stents to the SVG-->OM, 8/1/2022.  Patient with no exertional angina, SOB at this time. HRs are already in the 60s off beta blocker therapy. Continue off. Continue rosuvastatin 40mg daily. Goal LDL less than 70.   Continue Aspirin 81mg daily, Brilinta 90mg BID,  Lisinopril 10mg daily. \par \par 2. HTN: appears controlled. Continue Lisinopril 10mg daily\par \par 3. HLD: Continue rosuvastatin 40mg daily. Goal LDL less than 70.\par \par 4. Mild Mitral Valve Regurgitation: periodic echo surveillance. \par \par Follow up in 6 months.

## 2023-06-22 ENCOUNTER — APPOINTMENT (OUTPATIENT)
Dept: CARDIOLOGY | Facility: CLINIC | Age: 78
End: 2023-06-22

## 2023-06-27 ENCOUNTER — OFFICE (OUTPATIENT)
Dept: URBAN - METROPOLITAN AREA CLINIC 38 | Facility: CLINIC | Age: 78
Setting detail: OPHTHALMOLOGY
End: 2023-06-27
Payer: MEDICARE

## 2023-06-27 DIAGNOSIS — Z96.1: ICD-10-CM

## 2023-06-27 DIAGNOSIS — E11.3313: ICD-10-CM

## 2023-06-27 DIAGNOSIS — H02.831: ICD-10-CM

## 2023-06-27 DIAGNOSIS — H02.834: ICD-10-CM

## 2023-06-27 PROCEDURE — 92014 COMPRE OPH EXAM EST PT 1/>: CPT | Performed by: OPHTHALMOLOGY

## 2023-06-27 PROCEDURE — 92134 CPTRZ OPH DX IMG PST SGM RTA: CPT | Performed by: OPHTHALMOLOGY

## 2023-06-27 ASSESSMENT — CONFRONTATIONAL VISUAL FIELD TEST (CVF)
OD_FINDINGS: FULL
OS_FINDINGS: FULL

## 2023-06-27 ASSESSMENT — REFRACTION_AUTOREFRACTION
OD_CYLINDER: -0.25
OS_SPHERE: +0.25
OD_SPHERE: +0.25
OD_AXIS: 031
OS_CYLINDER: -0.50
OS_AXIS: 116

## 2023-06-27 ASSESSMENT — KERATOMETRY
OD_K1POWER_DIOPTERS: 43.75
OS_AXISANGLE_DEGREES: 041
OD_AXISANGLE_DEGREES: 096
OS_K1POWER_DIOPTERS: 43.75
METHOD_AUTO_MANUAL: AUTO
OS_K2POWER_DIOPTERS: 44.00
OD_K2POWER_DIOPTERS: 44.00

## 2023-06-27 ASSESSMENT — AXIALLENGTH_DERIVED
OD_AL: 23.4069
OS_AL: 23.455
OS_AL: 23.455

## 2023-06-27 ASSESSMENT — SUPERFICIAL PUNCTATE KERATITIS (SPK)
OD_SPK: T
OS_SPK: T

## 2023-06-27 ASSESSMENT — SPHEQUIV_DERIVED
OS_SPHEQUIV: 0
OS_SPHEQUIV: 0
OD_SPHEQUIV: 0.125

## 2023-06-27 ASSESSMENT — REFRACTION_MANIFEST
OS_SPHERE: +0.25
OS_VA1: 20/20-2
OD_VA1: 20/20-2
OD_AXIS: 030
OS_AXIS: 115
OS_CYLINDER: -0.50
OD_SPHERE: PLANO
OD_VA2: 20/20(J1+)
OS_VA2: 20/20(J1+)
OD_ADD: +2.50
OD_CYLINDER: -0.25
OU_VA: 20/20
OS_ADD: +2.50

## 2023-06-27 ASSESSMENT — VISUAL ACUITY
OD_BCVA: 20/20
OS_BCVA: 20/20

## 2023-06-27 ASSESSMENT — TONOMETRY
OD_IOP_MMHG: 11
OS_IOP_MMHG: 11

## 2023-06-27 ASSESSMENT — LID POSITION - DERMATOCHALASIS
OD_DERMATOCHALASIS: RUL 2+ 3+
OS_DERMATOCHALASIS: LUL 2+ 3+

## 2023-06-27 ASSESSMENT — REFRACTION_CURRENTRX
OD_ADD: +2.75
OS_ADD: +2.75
OD_OVR_VA: 20/
OS_OVR_VA: 20/

## 2023-07-20 ENCOUNTER — NON-APPOINTMENT (OUTPATIENT)
Age: 78
End: 2023-07-20

## 2023-07-20 ENCOUNTER — APPOINTMENT (OUTPATIENT)
Dept: CARDIOLOGY | Facility: CLINIC | Age: 78
End: 2023-07-20
Payer: MEDICARE

## 2023-07-20 VITALS
OXYGEN SATURATION: 99 % | HEART RATE: 64 BPM | WEIGHT: 175 LBS | BODY MASS INDEX: 28.12 KG/M2 | DIASTOLIC BLOOD PRESSURE: 70 MMHG | SYSTOLIC BLOOD PRESSURE: 126 MMHG | HEIGHT: 66 IN

## 2023-07-20 PROCEDURE — 93000 ELECTROCARDIOGRAM COMPLETE: CPT

## 2023-07-20 PROCEDURE — 99214 OFFICE O/P EST MOD 30 MIN: CPT

## 2023-07-20 RX ORDER — ASPIRIN 81 MG
81 TABLET, DELAYED RELEASE (ENTERIC COATED) ORAL DAILY
Refills: 0 | Status: DISCONTINUED | COMMUNITY
End: 2023-07-20

## 2023-07-20 RX ORDER — CHROMIUM 200 MCG
TABLET ORAL
Refills: 0 | Status: DISCONTINUED | COMMUNITY
End: 2023-07-20

## 2023-07-20 RX ORDER — ROSUVASTATIN CALCIUM 40 MG/1
40 TABLET, FILM COATED ORAL DAILY
Qty: 90 | Refills: 3 | Status: DISCONTINUED | COMMUNITY
End: 2023-07-20

## 2023-07-20 RX ORDER — GLIPIZIDE 10 MG/1
10 TABLET ORAL DAILY
Refills: 0 | Status: ACTIVE | COMMUNITY
Start: 2022-03-08

## 2023-07-20 NOTE — HISTORY OF PRESENT ILLNESS
[FreeTextEntry1] : Historical Perspective"\par This is a 77 year old male with CAD s/p CABG. Last year he had exertional angina. He was started on Toprol XL 25mg daily and Imdur 30mg daily, in addition to his other medications. Unclear when these medications dropped off. Patient has poor insight in his medications and medical history. He currently denies any exertional chest pain, SOB, or palpitations. He admits to compliance with all his other medications and reports no adverse effects. \par \par Current Health Status:\par Patient with no chest pain, SOB, or palpitations. Remains compliant with his medications and reports no adverse effects. Patient underwent cardiac catheterization with Dr. Alejo. He placed 3 stents in the SVG-->OM.

## 2023-07-20 NOTE — PHYSICAL EXAM
[Normal] : moves all extremities, no focal deficits, normal speech [de-identified] : No carotid artery bruits auscultated bilaterally

## 2023-07-20 NOTE — CARDIOLOGY SUMMARY
[de-identified] : 7/20/2023, NSR with Q waves inferiorly and non-specific T wave changes\par 05/5/2022, NSR with extensive anterolateral infarct, inferior infarct, negative T waves. [de-identified] : 05/06/2019, Exercise Nuclear Stress Test: Exercised for 4 minutes and 30 seconds utilizing Weston Protocol. Downsloping ST depressions developed inferolaterally with exercise. Nuclear images revealed medium sized defects inferiorly and inferolaterally that are fixed. Ischemia was not evident.  [de-identified] : 5/6/2019, Mild MR, Basal inferior wall hypokinetic, Trace MR. LVEF 55%. \par  [de-identified] : 8/1/2022, 3 stents to SVG-->OM

## 2023-07-20 NOTE — DISCUSSION/SUMMARY
[EKG obtained to assist in diagnosis and management of assessed problem(s)] : EKG obtained to assist in diagnosis and management of assessed problem(s) [FreeTextEntry1] : 1. Coronary Artery Disease: patient s/p CABG x 3 in the past. Also s/p PCI with three stents to the SVG-->OM, 8/1/2022.  Patient with no exertional angina, SOB at this time. HRs are already in the 60s off beta blocker therapy. Continue off. Continue rosuvastatin 40mg daily. Goal LDL less than 70.   Continue Aspirin 81mg daily and Brilinta 90mg BID.\par \par 2. HTN: off lisinopril given rise in creatinine to 1.59 on last labs. BP appears well controlled. Continue to monitor. \par \par 3. HLD: Continue rosuvastatin 40mg daily. Goal LDL less than 70.\par \par 4. Mild Mitral Valve Regurgitation: periodic echo surveillance. \par \par Follow up in 6 months.

## 2023-08-21 ENCOUNTER — APPOINTMENT (OUTPATIENT)
Dept: CARDIOLOGY | Facility: CLINIC | Age: 78
End: 2023-08-21

## 2023-12-27 ENCOUNTER — APPOINTMENT (OUTPATIENT)
Dept: CARDIOLOGY | Facility: CLINIC | Age: 78
End: 2023-12-27
Payer: MEDICARE

## 2023-12-27 PROCEDURE — 93306 TTE W/DOPPLER COMPLETE: CPT

## 2024-01-18 ENCOUNTER — APPOINTMENT (OUTPATIENT)
Dept: CARDIOLOGY | Facility: CLINIC | Age: 79
End: 2024-01-18
Payer: MEDICARE

## 2024-01-18 VITALS
BODY MASS INDEX: 30.53 KG/M2 | OXYGEN SATURATION: 100 % | WEIGHT: 190 LBS | SYSTOLIC BLOOD PRESSURE: 140 MMHG | DIASTOLIC BLOOD PRESSURE: 66 MMHG | HEART RATE: 71 BPM | HEIGHT: 66 IN

## 2024-01-18 DIAGNOSIS — E78.00 PURE HYPERCHOLESTEROLEMIA, UNSPECIFIED: ICD-10-CM

## 2024-01-18 DIAGNOSIS — I10 ESSENTIAL (PRIMARY) HYPERTENSION: ICD-10-CM

## 2024-01-18 DIAGNOSIS — R94.31 ABNORMAL ELECTROCARDIOGRAM [ECG] [EKG]: ICD-10-CM

## 2024-01-18 DIAGNOSIS — I25.10 ATHEROSCLEROTIC HEART DISEASE OF NATIVE CORONARY ARTERY W/OUT ANGINA PECTORIS: ICD-10-CM

## 2024-01-18 DIAGNOSIS — I34.0 NONRHEUMATIC MITRAL (VALVE) INSUFFICIENCY: ICD-10-CM

## 2024-01-18 PROCEDURE — 93000 ELECTROCARDIOGRAM COMPLETE: CPT

## 2024-01-18 PROCEDURE — 99214 OFFICE O/P EST MOD 30 MIN: CPT

## 2024-01-18 PROCEDURE — G2211 COMPLEX E/M VISIT ADD ON: CPT

## 2024-01-18 RX ORDER — TICAGRELOR 90 MG/1
90 TABLET ORAL TWICE DAILY
Qty: 90 | Refills: 2 | Status: DISCONTINUED | COMMUNITY
Start: 2022-08-01 | End: 2024-01-18

## 2024-01-18 RX ORDER — ROSUVASTATIN CALCIUM 40 MG/1
40 TABLET, FILM COATED ORAL
Qty: 90 | Refills: 3 | Status: ACTIVE | COMMUNITY
Start: 2024-01-18 | End: 1900-01-01

## 2024-01-18 RX ORDER — ROSUVASTATIN CALCIUM 40 MG/1
40 TABLET, FILM COATED ORAL
Qty: 90 | Refills: 3 | Status: DISCONTINUED | COMMUNITY
Start: 2023-07-20 | End: 2024-01-18

## 2024-01-18 RX ORDER — KRILL/OM-3/DHA/EPA/PHOSPHO/AST 1000-230MG
81 CAPSULE ORAL
Qty: 90 | Refills: 3 | Status: ACTIVE | COMMUNITY
Start: 2024-01-18 | End: 1900-01-01

## 2024-01-18 NOTE — PHYSICAL EXAM
[Normal] : moves all extremities, no focal deficits, normal speech [de-identified] : No carotid artery bruits auscultated bilaterally

## 2024-01-18 NOTE — HISTORY OF PRESENT ILLNESS
[FreeTextEntry1] : Historical Perspective" This is a 78 year old male with CAD s/p CABG. Last year he had exertional angina. He was started on Toprol XL 25mg daily and Imdur 30mg daily, in addition to his other medications. Unclear when these medications dropped off. Patient has poor insight in his medications and medical history. He currently denies any exertional chest pain, SOB, or palpitations. He admits to compliance with all his other medications and reports no adverse effects.   Current Health Status: Patient with no chest pain, SOB, or palpitations. Patient states "some doctor" stopped his Aspirin, Brillinta and rosuvastatin for an unclear reason. Wasn't having any issues with medications.

## 2024-01-18 NOTE — CARDIOLOGY SUMMARY
[de-identified] : 1/18/2024, NSR with Q waves inferiorly and non-specific T wave changes 7/20/2023, NSR with Q waves inferiorly and non-specific T wave changes 05/5/2022, NSR with extensive anterolateral infarct, inferior infarct, negative T waves. [de-identified] : 05/06/2019, Exercise Nuclear Stress Test: Exercised for 4 minutes and 30 seconds utilizing Weston Protocol. Downsloping ST depressions developed inferolaterally with exercise. Nuclear images revealed medium sized defects inferiorly and inferolaterally that are fixed. Ischemia was not evident.  [de-identified] : 12/27/2023, LV EF 55%, basal inferoseptal and basal inferior walls hypokinetic, mild MR, trace TR 5/6/2019, Mild MR, Basal inferior wall hypokinetic, Trace MR. LVEF 55%.   [de-identified] : 8/1/2022, 3 stents to SVG-->OM

## 2024-01-18 NOTE — DISCUSSION/SUMMARY
[FreeTextEntry1] : 1. Coronary Artery Disease: patient s/p CABG x 3 in the past. Also s/p PCI with three stents to the SVG-->OM, 8/1/2022.  Patient with no exertional angina, SOB at this time. HRs are already in the 60s off beta blocker therapy. Continue off. Continue rosuvastatin 40mg daily. Goal LDL less than 70.   Continue Aspirin 81mg daily.  2. HTN: off lisinopril given rise in creatinine to 1.59 on last labs. BP appears well controlled. Continue to monitor.   3. HLD: Continue rosuvastatin 40mg daily. Goal LDL less than 70.  4. Mild Mitral Valve Regurgitation: periodic echo surveillance.   Follow up in 6 months.  [EKG obtained to assist in diagnosis and management of assessed problem(s)] : EKG obtained to assist in diagnosis and management of assessed problem(s)

## 2024-03-13 PROBLEM — H25.13 CATARACT SENILE NUCLEAR SCLEROSIS; BOTH EYES: Status: ACTIVE | Noted: 2024-03-13

## 2024-05-16 PROBLEM — H40.033: Status: ACTIVE | Noted: 2024-05-16

## 2024-06-05 PROBLEM — H25.13 CATARACT SENILE NUCLEAR SCLEROSIS; BOTH EYES: Status: ACTIVE | Noted: 2024-06-05

## 2024-07-25 ENCOUNTER — NON-APPOINTMENT (OUTPATIENT)
Age: 79
End: 2024-07-25

## 2024-07-25 ENCOUNTER — APPOINTMENT (OUTPATIENT)
Dept: CARDIOLOGY | Facility: CLINIC | Age: 79
End: 2024-07-25
Payer: MEDICARE

## 2024-07-25 VITALS
HEART RATE: 64 BPM | WEIGHT: 177 LBS | HEIGHT: 66 IN | SYSTOLIC BLOOD PRESSURE: 138 MMHG | OXYGEN SATURATION: 97 % | BODY MASS INDEX: 28.45 KG/M2 | DIASTOLIC BLOOD PRESSURE: 72 MMHG

## 2024-07-25 DIAGNOSIS — R94.31 ABNORMAL ELECTROCARDIOGRAM [ECG] [EKG]: ICD-10-CM

## 2024-07-25 DIAGNOSIS — I10 ESSENTIAL (PRIMARY) HYPERTENSION: ICD-10-CM

## 2024-07-25 DIAGNOSIS — I25.10 ATHEROSCLEROTIC HEART DISEASE OF NATIVE CORONARY ARTERY W/OUT ANGINA PECTORIS: ICD-10-CM

## 2024-07-25 DIAGNOSIS — E78.00 PURE HYPERCHOLESTEROLEMIA, UNSPECIFIED: ICD-10-CM

## 2024-07-25 DIAGNOSIS — I34.0 NONRHEUMATIC MITRAL (VALVE) INSUFFICIENCY: ICD-10-CM

## 2024-07-25 PROCEDURE — 99214 OFFICE O/P EST MOD 30 MIN: CPT

## 2024-07-25 PROCEDURE — G2211 COMPLEX E/M VISIT ADD ON: CPT

## 2024-07-25 PROCEDURE — 93000 ELECTROCARDIOGRAM COMPLETE: CPT

## 2024-07-25 NOTE — HISTORY OF PRESENT ILLNESS
[FreeTextEntry1] : Historical Perspective" This is a 78 year old male with CAD s/p CABG. Last year he had exertional angina. He was started on Toprol XL 25mg daily and Imdur 30mg daily, in addition to his other medications. Unclear when these medications dropped off. Patient has poor insight in his medications and medical history. He currently denies any exertional chest pain, SOB, or palpitations. He admits to compliance with all his other medications and reports no adverse effects.   Current Health Status: Patient with no chest pain, SOB, or palpitations. No hospitalizations since seeing me last. Remains compliant with medications and reports no adverse effects.

## 2024-07-25 NOTE — PHYSICAL EXAM
[Normal] : moves all extremities, no focal deficits, normal speech [de-identified] : No carotid artery bruits auscultated bilaterally

## 2024-07-25 NOTE — CARDIOLOGY SUMMARY
[de-identified] : 7/25/2024, NSR with Q waves inferiorly and non-specific ST-T wave changes 1/18/2024, NSR with Q waves inferiorly and non-specific T wave changes 7/20/2023, NSR with Q waves inferiorly and non-specific T wave changes 05/5/2022, NSR with extensive anterolateral infarct, inferior infarct, negative T waves. [de-identified] : 05/06/2019, Exercise Nuclear Stress Test: Exercised for 4 minutes and 30 seconds utilizing Weston Protocol. Downsloping ST depressions developed inferolaterally with exercise. Nuclear images revealed medium sized defects inferiorly and inferolaterally that are fixed. Ischemia was not evident.  [de-identified] : 12/27/2023, LV EF 55%, basal inferoseptal and basal inferior walls hypokinetic, mild MR, trace TR 5/6/2019, Mild MR, Basal inferior wall hypokinetic, Trace MR. LVEF 55%.   [de-identified] : 8/1/2022, 3 stents to SVG-->OM

## 2025-01-24 ENCOUNTER — NON-APPOINTMENT (OUTPATIENT)
Age: 80
End: 2025-01-24

## 2025-01-24 ENCOUNTER — APPOINTMENT (OUTPATIENT)
Dept: CARDIOLOGY | Facility: CLINIC | Age: 80
End: 2025-01-24
Payer: MEDICARE

## 2025-01-24 VITALS
DIASTOLIC BLOOD PRESSURE: 60 MMHG | BODY MASS INDEX: 25.71 KG/M2 | SYSTOLIC BLOOD PRESSURE: 110 MMHG | HEIGHT: 66 IN | HEART RATE: 67 BPM | OXYGEN SATURATION: 96 % | WEIGHT: 160 LBS

## 2025-01-24 DIAGNOSIS — R94.31 ABNORMAL ELECTROCARDIOGRAM [ECG] [EKG]: ICD-10-CM

## 2025-01-24 DIAGNOSIS — I10 ESSENTIAL (PRIMARY) HYPERTENSION: ICD-10-CM

## 2025-01-24 DIAGNOSIS — E78.00 PURE HYPERCHOLESTEROLEMIA, UNSPECIFIED: ICD-10-CM

## 2025-01-24 DIAGNOSIS — I25.10 ATHEROSCLEROTIC HEART DISEASE OF NATIVE CORONARY ARTERY W/OUT ANGINA PECTORIS: ICD-10-CM

## 2025-01-24 DIAGNOSIS — I34.0 NONRHEUMATIC MITRAL (VALVE) INSUFFICIENCY: ICD-10-CM

## 2025-01-24 PROCEDURE — 99214 OFFICE O/P EST MOD 30 MIN: CPT

## 2025-01-24 PROCEDURE — G2211 COMPLEX E/M VISIT ADD ON: CPT

## 2025-01-24 PROCEDURE — 93000 ELECTROCARDIOGRAM COMPLETE: CPT

## 2025-01-24 RX ORDER — EMPAGLIFLOZIN 10 MG/1
10 TABLET, FILM COATED ORAL DAILY
Refills: 0 | Status: ACTIVE | COMMUNITY

## 2025-02-06 ENCOUNTER — APPOINTMENT (OUTPATIENT)
Dept: CARDIOLOGY | Facility: CLINIC | Age: 80
End: 2025-02-06
Payer: MEDICARE

## 2025-02-06 PROCEDURE — 93306 TTE W/DOPPLER COMPLETE: CPT

## 2025-02-13 NOTE — HISTORY OF PRESENT ILLNESS
"Care Management Follow Up    Length of Stay (days): 8    Expected Discharge Date: 02/13/2025     Concerns to be Addressed: discharge planning     Patient plan of care discussed at interdisciplinary rounds: Yes    Anticipated Discharge Disposition: Dialysis Services, Home Care  Anticipated Discharge Services: None  Anticipated Discharge DME: None    Patient/family educated on Medicare website which has current facility and service quality ratings:  yes  Education Provided on the Discharge Plan:  yes  Patient/Family in Agreement with the Plan: yes    Referrals Placed by CM/SW:  Home Care    Discussed  Partnership in Safe Discharge Planning  document with patient/family: No     Handoff Completed: No, handoff not indicated or clinically appropriate    Additional Information:  Patient was provided w transport resources per her request. She stated she has a friend who can provide transport to/from dialysis but not \"forever.\" CM also encouraged her to follow up w [facility]. Patient is aware she has been set up for OPHD at [facility].     OT recs for HH OT, patient was agreeable and okay w adding RN as well, CM sent referral. She has no agency preference.    Next Steps: follow up on HC referral, update [facility] when patient discharges    Care Management Discharge Note    Discharge Date: 02/13/2025       Discharge Disposition: Dialysis Services w/ [facility]  Discharge Services: Home Care referral sent  Discharge DME: None  Discharge Transportation: car, drives self, family or friend will provide    Patient/family educated on Medicare website which has current facility and service quality ratings:  yes    Education Provided on the Discharge Plan:  yes  Persons Notified of Discharge Plans: patient, hospitalist, nephrologist  Patient/Family in Agreement with the Plan: yes    Handoff Referral Completed: Yes, MARISSA PCP: Internal handoff referral completed    Additional Information:  RN CC following in " [FreeTextEntry1] : CAD: Patient is status post coronary artery bypass grafting.  Recently underwent stenting of SVG to OM.  He got marked clinical benefit.  There has been no recurrence of chest discomfort or shortness of breath.  The patient feels excellent.\par \par The need for compliance with dual antiplatelet therapy has been reviewed.\par \par Hypertension: Well-controlled.\par \par Anemia: Repeat CBC has been arranged.\par \par Renal insufficiency: At [facility] his renal function was normal.\par \par Hyperlipidemia: Today we increased his rosuvastatin from 20 mg daily up to 40 mg daily.  LFTs and cholesterol profile in the fasting state have been arranged for 6 weeks time.\par \par Echo, labs, cath reviewed. collaboration with [facility]ph[facility] for initiation of new OP HD. [phone] was updated we anticipate patient will discharge today. [facility] admissions confirmed appt details below. A VM was left for [facility] location as well w update that patient should have first appt on 2/14.    Final confirmation plan for new OP HD is as follows:  Patient will be discharging to home and have outpatient dialysis at:  [facility]  First Outpatient run will be on 2/14/25. Patient should arrive at 1345 for the first run to complete paperwork.  For subsequent runs, chair time will be every MWF at 1435. Arrival should be 15 min before chair time.  Transportation to and from dialysis will be provided by: friend    AVS discharge instructions and patient were updated on final plan of care.    A home care referral was sent to [facility] hub for RN/OT. They are looking for an accepting agency.    Addendum 1443: [facility] accepted for  RN/OT, CM sent discharge orders and requested to add PT per hospitalist orders.     Savanah Lema, RN, BSN  Inpatient Care Coordination  [facility]  [phone]

## 2025-07-15 PROBLEM — H25.13 CATARACT SENILE NUCLEAR SCLEROSIS; BOTH EYES: Status: ACTIVE | Noted: 2025-07-15

## 2025-08-01 ENCOUNTER — APPOINTMENT (OUTPATIENT)
Dept: CARDIOLOGY | Facility: CLINIC | Age: 80
End: 2025-08-01
Payer: MEDICARE

## 2025-08-01 VITALS
OXYGEN SATURATION: 99 % | DIASTOLIC BLOOD PRESSURE: 66 MMHG | HEART RATE: 60 BPM | SYSTOLIC BLOOD PRESSURE: 122 MMHG | HEIGHT: 66 IN | WEIGHT: 160 LBS | BODY MASS INDEX: 25.71 KG/M2

## 2025-08-01 DIAGNOSIS — E78.00 PURE HYPERCHOLESTEROLEMIA, UNSPECIFIED: ICD-10-CM

## 2025-08-01 DIAGNOSIS — I25.10 ATHEROSCLEROTIC HEART DISEASE OF NATIVE CORONARY ARTERY W/OUT ANGINA PECTORIS: ICD-10-CM

## 2025-08-01 DIAGNOSIS — I10 ESSENTIAL (PRIMARY) HYPERTENSION: ICD-10-CM

## 2025-08-01 DIAGNOSIS — I34.0 NONRHEUMATIC MITRAL (VALVE) INSUFFICIENCY: ICD-10-CM

## 2025-08-01 PROCEDURE — 99214 OFFICE O/P EST MOD 30 MIN: CPT

## 2025-08-01 PROCEDURE — G2211 COMPLEX E/M VISIT ADD ON: CPT

## 2025-08-01 PROCEDURE — 93000 ELECTROCARDIOGRAM COMPLETE: CPT
